# Patient Record
Sex: FEMALE | Race: ASIAN | URBAN - NONMETROPOLITAN AREA
[De-identification: names, ages, dates, MRNs, and addresses within clinical notes are randomized per-mention and may not be internally consistent; named-entity substitution may affect disease eponyms.]

---

## 2021-05-22 ENCOUNTER — IMPORTED ENCOUNTER (OUTPATIENT)
Dept: URBAN - NONMETROPOLITAN AREA CLINIC 1 | Facility: CLINIC | Age: 56
End: 2021-05-22

## 2021-05-22 PROBLEM — H18.613: Noted: 2021-05-22

## 2021-05-22 NOTE — PATIENT DISCUSSION
Keratoconus OU - Discussed dx in detail with patient- Paient is stable Vision 20/20 OU today- Patient wears Gas permiable contact and likes her contacts. - Discussed the irregular astigmatism that keratoconus causes. - Baseline Topography done today to map astigmatism- Topography to be done at every visit. - recommend dilated annual eye exams- new glasses RX given today.

## 2022-04-09 ASSESSMENT — VISUAL ACUITY
OD_SC: 20/20
OS_SC: 20/20
OD_PH: 20/40
OS_CC: 20/100
OS_PH: 20/60

## 2022-04-09 ASSESSMENT — TONOMETRY
OS_IOP_MMHG: 14
OD_IOP_MMHG: 15